# Patient Record
(demographics unavailable — no encounter records)

---

## 2024-10-07 NOTE — PHYSICAL EXAM
[Chaperone Present] : A chaperone was present in the examining room during all aspects of the physical examination [FreeTextEntry2] : Mariangel Adams [FreeTextEntry1] : General: Not in acute distress, alert and oriented x3. Neck: Supple. No lymphadenopathy.  Abdomen: Soft, nontender, and nondistended. No obvious hepatosplenomegaly. No obvious hernias.  Laparoscopic trocar site scars Pelvic Exam: Normal external female genitalia. Saddle sensory exam S2 to S4 is intact. Perineal reflexes not visualized. Urethra is hypermobile without prolapse, exudates, or lesions. Cough stress test is negative .  She voided more than 100 cc spontaneously.  Post void residual was checked with I/O cath and was 140 cc of clear, dilute urine. Pale and mildly atrophic-appearing vaginal epithelium. No vaginal blood or discharge. Cervix without abnormal lesions. Bimanual exam reveals a small uterus in normal positioning. No palpable adnexal masses or tenderness.  All vaginal compartments are well supported

## 2024-10-07 NOTE — PROCEDURE
[Straight Catheterization] : insertion of a straight catheter [Urinary Frequency] : urinary frequency [Patient] : the patient [Clear] : clear [Culture] : culture [Urinalysis] : urinalysis [No Complications] : no complications [Tolerated Well] : the patient tolerated the procedure well [Post procedure instructions and information given] : Post procedure instructions and information were given and reviewed with patient. [0] : 0

## 2024-10-07 NOTE — HISTORY OF PRESENT ILLNESS
[FreeTextEntry1] : Patient is a 54-year-old para 2 ( x 2) who presents today for evaluation and management of recurrent symptoms of urinary tract infections as well as urinary frequency, urgency, and urinary incontinence Patient reports frequent urinary tract infections since April of this year.  She reports sudden onset onset of pain with urination, increased frequency urgency, sensation of incomplete bladder emptying etc. she did notice some blood on wiping once.  She has been going to urgent care.  I reviewed her urine cultures on the urgent care portal on patient's iPhone.  Her urine culture from 2024 was positive for greater than 100,000 CFU for E. coli.  Urine culture from May 2024 was also positive for E. coli.  Her most recent urine culture was from first week of September which grew Klebsiella greater than 100,000 CFU per mL.  Today's visit is her first consultation for this issue.  She has not been taking any daily dose of antibiotic or other prophylaxis strategies. She reports occasional urine leakage with sneezing.  She is a marathon runner.  She denies leaking during running etc.  She also present and strong urge to void where she might lose a drop of urine.  She wakes up up to 4 times per night to urinate however she endorses intake of water before going to bed and keeps a water bottle by her bedside Denies history of nephrolithiasis. Daily fluid intake consist of coffee, water, Bill  Vaginal symptoms: She denies feeling vaginal bulge or pressure.  Denies sensation of vaginal dryness however she states that her vagina does not feel the same to her.  Denies pain with intimacy  Bowel symptoms: She reports intermittent constipation alternating with diarrhea.  She has history of colon resection first in , for suspected intussusception.  She had recurrent symptoms in 2006 and underwent colon resection.  She developed postoperative incisional hernia which was repaired in 2008.  Denies fecal incontinence   GYN history: LMP in 2021.  Underwent Essure procedure  Past medical history: Lupus, hernia, chronic constipation  Past surgical history: Colon resection in 2006 and 2006, hernia repair in 2008 (all the surgeries done by Dr. Zhou at [place])  Current medications hydroxychloroquine

## 2024-10-07 NOTE — ASSESSMENT
[FreeTextEntry1] : Patient is a 54-year-old multipara with history of colon resection, presenting with recurrent urinary tract infections since April 2024.  On exam, her postvoid residual was mildly elevated at 140 cc however voided volume was not measured.  In addition the urine appeared dilute and clear not suggestive of chronic urinary retention.  Vaginal compartments are well supported.

## 2024-10-07 NOTE — DISCUSSION/SUMMARY
[FreeTextEntry1] : 1.  Cath urine specimen was sent for UA and culture. If today's urine culture returns positive, will treat the infection as indicated with antibiotic such as Keflex 500 mg p.o. twice daily for 7 days  2. Discussed UTI prophylaxis strategies. She meets the criteria for recurrent urinary tract infections. Recommend Keflex 250 mg p.o. daily for 90 days  3. Provided her scripts for urine culture, sterile cups, lab info etc and advised her to get a culture done and call our office if she develops recurrent symptoms 4. May take Pyridium 200 mg p.o. 3 times daily as needed for ongoing urinary symptoms.  5. Discussed avoidance of constipation and diarrhea.  Advised her to start taking daily fiber supplement such as Metamucil or Citrucel with dinner. Written and verbal instructions provided. 6. Recommending avoidance of bladder irritants  7. Discussed strategies to improve nocturia including nighttime fluid restriction 8.   Discussed role of vaginal estradiol cream 0.1 mg/g - 1 g every night for 2 weeks followed by 1 g per vagina 2-3 times per week for UTI prophylaxis.  Prescription sent 9.  Recommended CT urogram to exclude nephrolithiasis etc. due to history of gross hematuria. Prescription provided.  Discussed potential need to undergo cystoscopy based on the findings of urine analysis and culture 10.  Follow-up in 2-3 months

## 2024-10-07 NOTE — HISTORY OF PRESENT ILLNESS
[FreeTextEntry1] : Patient is a 54-year-old para 2 ( x 2) who presents today for evaluation and management of recurrent symptoms of urinary tract infections as well as urinary frequency, urgency, and urinary incontinence Patient reports frequent urinary tract infections since April of this year.  She reports sudden onset onset of pain with urination, increased frequency urgency, sensation of incomplete bladder emptying etc. she did notice some blood on wiping once.  She has been going to urgent care.  I reviewed her urine cultures on the urgent care portal on patient's iPhone.  Her urine culture from 2024 was positive for greater than 100,000 CFU for E. coli.  Urine culture from May 2024 was also positive for E. coli.  Her most recent urine culture was from first week of September which grew Klebsiella greater than 100,000 CFU per mL.  Today's visit is her first consultation for this issue.  She has not been taking any daily dose of antibiotic or other prophylaxis strategies. She reports occasional urine leakage with sneezing.  She is a marathon runner.  She denies leaking during running etc.  She also present and strong urge to void where she might lose a drop of urine.  She wakes up up to 4 times per night to urinate however she endorses intake of water before going to bed and keeps a water bottle by her bedside Denies history of nephrolithiasis. Daily fluid intake consist of coffee, water, Bill  Vaginal symptoms: She denies feeling vaginal bulge or pressure.  Denies sensation of vaginal dryness however she states that her vagina does not feel the same to her.  Denies pain with intimacy  Bowel symptoms: She reports intermittent constipation alternating with diarrhea.  She has history of colon resection first in , for suspected intussusception.  She had recurrent symptoms in 2006 and underwent colon resection.  She developed postoperative incisional hernia which was repaired in 2008.  Denies fecal incontinence   GYN history: LMP in 2021.  Underwent Essure procedure  Past medical history: Lupus, hernia, chronic constipation  Past surgical history: Colon resection in 2006 and 2006, hernia repair in 2008 (all the surgeries done by Dr. Zhou at [place])  Current medications hydroxychloroquine

## 2024-12-09 NOTE — DISCUSSION/SUMMARY
[FreeTextEntry1] : Following management plan was outlined: 1.  Measure voided urine volume followed by postvoid residual checked with bladder scan that is 0ml.  2.  Recommend cystoscopy 3.  Recommend starting methenamine 1 g p.o. twice daily x 3-month after completion of Keflex ppx. Prescription provided. 4.  Continue vaginal estradiol cream 2-3 times per week. 5.  Continue perineal washing, avoidance of constipation etc. 6.  Incorporate Metamucil/Citrucel daily in your diet for regular bowel movements. For acute relief of constipation take MiraLAX. 7.  Instructed to call with any questions or concerns and she verbalizes understanding.

## 2024-12-09 NOTE — PROCEDURE
[Residual Volume ___ cc] : residual volume [unfilled] cc [No Complications] : no complications [Tolerated Well] : the patient tolerated the procedure well [Post procedure instructions and information given] : Post procedure instructions and information were given and reviewed with patient.

## 2024-12-09 NOTE — ASSESSMENT
[FreeTextEntry1] : Patient is a 55-year-old multipara with history of colon resection, presenting with recurrent urinary tract infections since April 2024.  Recent CT urogram was negative for evidence of hydronephrosis, nephrolithiasis, renal mass etc. mild bladder wall thickening was noted which could be related to under distention of the bladder during the radiological exam

## 2024-12-09 NOTE — HISTORY OF PRESENT ILLNESS
[FreeTextEntry1] : Patient is a 55-year-old multipara with history of colon resection in 2006, who is presenting with recurrent urinary tract infections since April 2024.  Patient was seen in the office for initial consultation on 10/7/2024.  During that visit, her postvoid residual was mildly elevated at 140 cc however voided volume was not measured. In addition the urine appeared dilute and clear not suggestive of chronic urinary retention. Vaginal compartments were well supported.    Patient's urinalysis and urine culture from October 2024 were both negative.  I reviewed patient's CT urogram done for further evaluation of recurrent urinary tract infections and hematuria.  She has normal-appearing kidneys with no evidence of hydronephrosis, nephrolithiasis or solid mass.  Mild bladder wall thickening was noted on delayed imaging.  Additional findings included postsurgical changes associated with bowel anastomosis.  She was started on Keflex 250 mg p.o. daily for 90 days on 10/7/2024.  She was also prescribed vaginal estradiol cream 0.1 mg/g - 1 g per vagina 2-3 times per week in October 2024.  Pyridium 200 mg p.o. 3 times daily as needed for bladder discomfort was also prescribed.  Pt presents today for scheduled follow up. Pt seems very happy with her improvement. Reports taking Keflex 250mg w/o breakthrough UTI symptoms. Reports using estradiol vaginal cream 2-3 times per week. Reports constipation. She is not taking anything for constipation. Denies UTI like symptoms and sensation of incomplete bladder emptying. Her PVR in the office is 0ml with bladder scan.

## 2024-12-09 NOTE — PHYSICAL EXAM
[FreeTextEntry1] : General: Not in acute distress, alert and oriented x3.Neck: Supple. No lymphadenopathy.  Abdomen: Soft, nontender, and nondistended. No obvious hepatosplenomegaly. No obvious hernias.  Her PVR is 0ml with bladder scan.

## 2025-04-02 NOTE — DISCUSSION/SUMMARY
[FreeTextEntry1] : Marge has hx of recurrent UTI that she is managing with ESTRING. After detailed discussion following plan was outlined.  - Continue taking Methenamine 1 gm twice daily with food until it is finished. If she gets UTI like symptoms after finishing this script call to schedule appt for cath specimen.  - Advise to change ESTRING every three months. She is due soon for change. She has three refills on the current script. Call to get refill. - Follow up in six months. - Instructed to call with any questions or concerns and she verbalizes understanding.

## 2025-04-02 NOTE — HISTORY OF PRESENT ILLNESS
[FreeTextEntry1] : Marge is a 55-year-old multipara with history of colon resection in 2006 is struggling with recurrent urinary tract infections since April 2024.   Her urine culture from October 2024 were both negative.  CT urogram done on normal kidneys with no evidence of hydronephrosis, nephrolithiasis or solid mass. Mild bladder wall thickening was noted.  Pt presents today for scheduled two month follow up. Reports feeling much better. States she finished Keflex 250mg once daily for 90 days and now taking Methenamine 1gm twice a day as per Dr. Flores's recommendations. She also filled the script of ESTRING and insert it herself in the vagina following directions. Reports tolerating ESTRING well. Denies UTI like symptoms, vaginal symptoms. She has alternating constipation with diarrhea but now a days it is better.